# Patient Record
Sex: FEMALE | Race: WHITE | NOT HISPANIC OR LATINO | Employment: FULL TIME | URBAN - METROPOLITAN AREA
[De-identification: names, ages, dates, MRNs, and addresses within clinical notes are randomized per-mention and may not be internally consistent; named-entity substitution may affect disease eponyms.]

---

## 2023-08-29 ENCOUNTER — HOSPITAL ENCOUNTER (EMERGENCY)
Facility: HOSPITAL | Age: 43
Discharge: HOME | End: 2023-08-29
Admitting: EMERGENCY MEDICINE

## 2023-08-29 VITALS
RESPIRATION RATE: 16 BRPM | HEART RATE: 88 BPM | OXYGEN SATURATION: 100 % | TEMPERATURE: 97.1 F | SYSTOLIC BLOOD PRESSURE: 152 MMHG | HEIGHT: 67 IN | WEIGHT: 141 LBS | BODY MASS INDEX: 22.13 KG/M2 | DIASTOLIC BLOOD PRESSURE: 97 MMHG

## 2023-08-29 DIAGNOSIS — M77.00 MEDIAL EPICONDYLITIS: ICD-10-CM

## 2023-08-29 PROCEDURE — 99283 EMERGENCY DEPT VISIT LOW MDM: CPT

## 2023-08-29 RX ORDER — METHYLPREDNISOLONE 4 MG
TABLET, DOSE PACK ORAL
Qty: 21 TABLET | Refills: 0 | Status: SHIPPED | OUTPATIENT
Start: 2023-08-29

## 2023-08-29 ASSESSMENT — ENCOUNTER SYMPTOMS
JOINT SWELLING: 1
NUMBNESS: 1
SHORTNESS OF BREATH: 0
ARTHRALGIAS: 1

## 2023-08-29 ASSESSMENT — ACTIVITIES OF DAILY LIVING (ADL): ADLS_ACUITY_SCORE: 33

## 2023-08-29 NOTE — Clinical Note
Dee Dee Ambrose was seen and treated in our emergency department on 8/29/2023.  She may return to work on 09/04/2023.       If you have any questions or concerns, please don't hesitate to call.      Gisele Blake PA-C

## 2023-08-30 NOTE — ED PROVIDER NOTES
EMERGENCY DEPARTMENT ENCOUNTER      NAME: Dee Dee Ambrose  AGE: 42 year old female  YOB: 1980  MRN: [id number]  EVALUATION DATE & TIME: 8/29/2023 10:54 PM    PCP: No Ref-Primary, Physician    ED PROVIDER: Gisele Blake PA-C      Chief Complaint   Patient presents with    elbow pain         FINAL IMPRESSION:  1. Medial epicondylitis          ED COURSE & MEDICAL DECISION MAKING:    Pertinent Labs & Imaging studies reviewed. (See chart for details)    42 year old female presents to the Emergency Department for evaluation of elbow pain.    Physical exam is remarkable for a generally well-appearing female who is in no acute distress.  She has tenderness to palpation on the bilateral medial epicondyles, no overlying erythema, warmth, or swelling noted.  She has normal range of motion of the shoulders, wrists, and fingers.  She has good distal sensation in the bilateral hands with capillary refill less than 2 seconds, strong radial pulses bilaterally.  She endorses pain with full flexion of the bilateral elbows.  Vital signs are stable and she is afebrile.    I do not think any emergent labs or imaging are indicated at this time.  The patient denies any symptoms concerning for systemic infection and there is no clinical evidence of septic arthritis, cellulitis, or septic bursitis at this time.  She denies any history of bony trauma/falls so I do not think x-rays would be helpful today.  Symptoms are most consistent with a medial epicondylitis likely from overuse at work.  I prescribed her a Medrol Dosepak and we discussed use of Tylenol and ibuprofen for pain as well as ice.  Work note provided so she can rest the areas.  Advised her to follow-up with her primary care provider for a recheck and return here for any new or worsening symptoms.  The patient is agreeable with this treatment plan and verbalized understanding.    Medical Decision Making    History:  Supplemental history from: Documented in  chart, if applicable  External Record(s) reviewed: Documented in chart, if applicable.    Work Up:  Chart documentation includes differential considered and any EKGs or imaging independently interpreted by provider, where specified.  In additional to work up documented, I considered the following work up: Imaging XR, but deferred due to no trauma.    External consultation:  Discussion of management with another provider: Documented in chart, if applicable    Complicating factors:  Care impacted by chronic illness: N/A  Care affected by social determinants of health: N/A    Disposition considerations: Discharge. I prescribed additional prescription strength medication(s) as charted. N/A.    ED Course   11:10 PM Performed my initial history and physical exam. Discussed workup in the emergency department, management of symptoms, and likely disposition. I discussed the plan for discharge with the patient or family and they are agreeable.. We discussed supportive cares at home and reasons for return to the ER including new or worsening symptoms - all questions and concerns addressed. Patient to be discharged by RN.    At the conclusion of the encounter I discussed the results of all of the tests and the disposition. The questions were answered. The patient or family acknowledged understanding and was agreeable with the care plan.     Voice recognition software was used in the creation of this note. Any grammatical or nonsensical errors are due to inherent errors with the software and are not the intention of the writer.     MEDICATIONS GIVEN IN THE EMERGENCY:  Medications - No data to display    NEW PRESCRIPTIONS STARTED AT TODAY'S ER VISIT  Discharge Medication List as of 8/29/2023 11:33 PM        START taking these medications    Details   methylPREDNISolone (MEDROL DOSEPAK) 4 MG tablet therapy pack Follow Package Directions, Disp-21 tablet, R-0, E-Prescribe                   =================================================================    HPI    Patient information was obtained from: patient    Use of : N/A        Dee Dee Ambrose is a 42 year old female who presents to this ED with bilateral elbow pain.     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 She developed pain in her bilateral medial elbows that started after 8 months working there and has increasingly gotten worse. She has struggled to unlock doors and lift objects secondary to the pain. She has numbness in her elbow but no numbness or tingling in her arms. She has swelling in her arms and takes ibuprofen for the pain with temporary improvement. Denies redness, chest pain, shortness of breath, or any other complaints at this time.      REVIEW OF SYSTEMS   Review of Systems   Respiratory:  Negative for shortness of breath.    Cardiovascular:  Negative for chest pain.   Musculoskeletal:  Positive for arthralgias (Bilateral elbow) and joint swelling.        No redness   Neurological:  Positive for numbness.       All other systems reviewed and are negative unless noted in HPI.      PAST MEDICAL HISTORY:  No past medical history on file.    PAST SURGICAL HISTORY:  No past surgical history on file.    CURRENT MEDICATIONS:    methylPREDNISolone (MEDROL DOSEPAK) 4 MG tablet therapy pack  IBU-200 OR  oxycodone-acetaminophen (PERCOCET) 5-325 MG per tablet        ALLERGIES:  No Known Allergies    FAMILY HISTORY:  Family History   Problem Relation Age of Onset    Cancer Mother         lung    Unknown/Adopted Father        SOCIAL HISTORY:   Social History     Socioeconomic History    Marital status: Unknown   Tobacco Use    Smoking status: Every Day     Packs/day: 0.50     Years: 15.00     Pack years: 7.50     Types: Cigarettes   Substance and Sexual Activity    Alcohol use: Yes     Comment: rarely    Drug use: No       VITALS:  Patient Vitals for the past 24 hrs:   BP Temp Temp src Pulse Resp SpO2  "Height Weight   08/29/23 2224 (!) 152/97 97.1  F (36.2  C) Temporal 88 16 100 % 1.702 m (5' 7\") 64 kg (141 lb)       PHYSICAL EXAM    VITAL SIGNS: BP (!) 152/97   Pulse 88   Temp 97.1  F (36.2  C) (Temporal)   Resp 16   Ht 1.702 m (5' 7\")   Wt 64 kg (141 lb)   LMP 08/04/2023   SpO2 100%   BMI 22.08 kg/m    General Appearance: Alert, cooperative, normal speech and facial symmetry, appears stated age, the patient does not appear in distress  Head:  Normocephalic, without obvious abnormality, atraumatic  Extremities:  Tenderness to palpation on the bilateral medial epicondyles, no overlying erythema, warmth, or swelling noted.  She has normal range of motion of the shoulders, wrists, and fingers.  She has good distal sensation in the bilateral hands with capillary refill less than 2 seconds, strong radial pulses bilaterally.  She endorses pain with full flexion of the bilateral elbows.  Neuro: Patient is awake, alert, and responsive to voice. No gross motor weaknesses or sensory loss; moves all extremities.    LAB:  All pertinent labs reviewed and interpreted.  Labs Ordered and Resulted from Time of ED Arrival to Time of ED Departure - No data to display    RADIOLOGY:  Reviewed all pertinent imaging. Please see official radiology report.  No orders to display         Luís HAYNES, am serving as a scribe to document services personally performed by Gisele Blake PA-C based on my observation and the provider's statements to me. Gisele HAYNES PA-C attest that Luís Spencer is acting in a scribe capacity, has observed my performance of the services and has documented them in accordance with my direction.     Gisele Blake PA-C  Emergency Medicine  [facility] EMERGENCY DEPARTMENT  [address] [id number] [phone]  Dept: [phone]       Gisele Blake PA-C  08/30/23 0033    "

## 2023-08-30 NOTE — DISCHARGE INSTRUCTIONS
You were seen here today for evaluation of elbow pain.  Your exam today is consistent with medial epicondylitis (golfer's elbow).    I will prescribe you a steroid Dosepak to help reduce inflammation in your elbows. You may take Tylenol and ibuprofen for pain/fever, do not exceed 4000 mg of Tylenol per day or 3200 mg ofibuprofen per day.    Ice for 20 minutes/h.    Follow-up with your primary care clinic for recheck if your symptoms are not improving.  Return here for any new or worsening symptoms including severe pain, fever, worsening redness or swelling, loss of sensation or function in your hands, or any other symptoms that concern you.

## 2023-08-30 NOTE — ED TRIAGE NOTES
Patient reports that she has bilateral elbow pain that has been progressing over the past few months. She reports having difficulty lifting anything. 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      Triage Assessment       Row Name 08/29/23 7625       Triage Assessment (Adult)    Airway WDL WDL       Respiratory WDL    Respiratory WDL WDL       Cardiac WDL    Cardiac WDL WDL       Cognitive/Neuro/Behavioral WDL    Cognitive/Neuro/Behavioral WDL WDL

## 2023-09-03 ENCOUNTER — HOSPITAL ENCOUNTER (EMERGENCY)
Facility: HOSPITAL | Age: 43
Discharge: HOME | End: 2023-09-03
Attending: EMERGENCY MEDICINE | Admitting: EMERGENCY MEDICINE

## 2023-09-03 VITALS
OXYGEN SATURATION: 100 % | WEIGHT: 141 LBS | SYSTOLIC BLOOD PRESSURE: 144 MMHG | TEMPERATURE: 98.6 F | DIASTOLIC BLOOD PRESSURE: 83 MMHG | BODY MASS INDEX: 22.08 KG/M2 | HEART RATE: 80 BPM | RESPIRATION RATE: 16 BRPM

## 2023-09-03 DIAGNOSIS — M77.02 GOLFERS ELBOW OF LEFT UPPER EXTREMITY: ICD-10-CM

## 2023-09-03 PROCEDURE — 99282 EMERGENCY DEPT VISIT SF MDM: CPT

## 2023-09-03 RX ORDER — IBUPROFEN 400 MG/1
400 TABLET, FILM COATED ORAL EVERY 6 HOURS PRN
Qty: 20 TABLET | Refills: 0 | Status: SHIPPED | OUTPATIENT
Start: 2023-09-03

## 2023-09-03 RX ORDER — IBUPROFEN 600 MG/1
600 TABLET, FILM COATED ORAL ONCE
Status: DISCONTINUED | OUTPATIENT
Start: 2023-09-03 | End: 2023-09-03 | Stop reason: HOSPADM

## 2023-09-03 NOTE — ED PROVIDER NOTES
EMERGENCY DEPARTMENT ENCOUNTER      NAME: Dee Dee Ambrose  AGE: 42 year old female  YOB: 1980  MRN: [id number]  EVALUATION DATE & TIME: 9/3/2023  3:42 PM    PCP: No Ref-Primary, Physician    ED PROVIDER: Bharati Gabriel M.D.      Chief Complaint   Patient presents with    Elbow Injury         FINAL IMPRESSION:  1. Nani elbow of left upper extremity          ED COURSE & MEDICAL DECISION MAKING:    ED Course as of 09/03/23 1909   Sun Sep 03, 2023   1613 Pt here because today is last day of steroids for paresthesia and pain to left elbow and paresthesia left little finger and ring finger primarily palmar aspect iwth known Haritha's Elbow, because her work note expires today, last dose steroid today, exacerbated symtpoms last night while carrying heavy objects, wants further time off work and ok with plan to follow up with orthopedics and NSAID therapy. Patient discharged after being provided with extensive anticipatory guidance and given return precautions, importance of PMD follow-up emphasized.        Pertinent Labs & Imaging studies reviewed. (See chart for details)    N95 worn  A face shield was worn also  COVID PPE    Medical Decision Making    History:  Supplemental history from: N/A  External Record(s) reviewed: Documented in chart, if applicable.    Work Up:  Chart documentation includes differential considered and any EKGs or imaging independently interpreted by provider, where specified.  In additional to work up documented, I considered the following work up: Documented in chart, if applicable.    External consultation:  Discussion of management with another provider: Documented in chart, if applicable    Complicating factors:  Care impacted by chronic illness: N/A  Care affected by social determinants of health: N/A    Disposition considerations: Discharge. I prescribed additional prescription strength medication(s) as charted. See documentation for any additional details.        At the  conclusion of the encounter I discussed the results of all of the tests and the disposition. The questions were answered. The patient or family acknowledged understanding and was agreeable with the care plan.     MEDICATIONS GIVEN IN THE EMERGENCY:  Medications - No data to display      NEW PRESCRIPTIONS STARTED AT TODAY'S ER VISIT  Discharge Medication List as of 9/3/2023  4:23 PM        START taking these medications    Details   !! ibuprofen (ADVIL/MOTRIN) 400 MG tablet Take 1 tablet (400 mg) by mouth every 6 hours as needed for moderate pain, Disp-20 tablet, R-0, E-Prescribe       !! - Potential duplicate medications found. Please discuss with provider.             =================================================================    HPI    As per chart review: Patient seen at [facility] ED on 8/29/23 (5 days ago) for evaluation of elbow pain diagnosed as medial epicondylitis from overuse at work. Exam reassuring. No labs or imaging were performed at this time. Patient was discharged with a methylprednisolone, tylenol, and ibuprofen. Patient recommended follow-up with PCP.     Dee Dee Ambrose is a 42 year old female with no contributory PMHx who presents to the ED today via walking with elbow pain.     Patient reports recurring bilateral pain in her elbows. She reports numbness in her left pinky and middle fingers. She rates her current pain 7/10, although acknowledges that her pain was worse earlier. 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 Patient states that she is left-handed.       REVIEW OF SYSTEMS   All other systems reviewed and are negative except as noted above in HPI.    PAST MEDICAL HISTORY:  No past medical history on file.    PAST SURGICAL HISTORY:  No past surgical history on file.    CURRENT MEDICATIONS:    ibuprofen (ADVIL/MOTRIN) 400 MG tablet  IBU-200 OR  methylPREDNISolone (MEDROL DOSEPAK) 4 MG tablet therapy  pack  oxycodone-acetaminophen (PERCOCET) 5-325 MG per tablet        ALLERGIES:  No Known Allergies    FAMILY HISTORY:  Family History   Problem Relation Age of Onset    Cancer Mother         lung    Unknown/Adopted Father        SOCIAL HISTORY:   Social History     Socioeconomic History    Marital status: Single   Tobacco Use    Smoking status: Every Day     Packs/day: 0.50     Years: 15.00     Pack years: 7.50     Types: Cigarettes   Substance and Sexual Activity    Alcohol use: Yes     Comment: rarely    Drug use: No       VITALS:  Patient Vitals for the past 24 hrs:   BP Temp Temp src Pulse Resp SpO2 Weight   09/03/23 1533 (!) 144/83 98.6  F (37  C) Oral 80 16 100 % 64 kg (141 lb)       PHYSICAL EXAM    GENERAL: Awake, alert.  In no acute distress.   HEENT: Normocephalic, atraumatic.  Pupils equal, round and reactive.  Conjunctiva normal.  EOMI.  NECK: No stridor or apparent deformity.  EXTREMITIES: No lower extremity swelling or edema. Left forearm and elbow supination and pronation intact. Pulse 2+. Left hand  5/5. Lumbrical flexion 5/5. Finger abduction 5/5.   NEURO: Alert and oriented to person, place and time.  Cranial nerves grossly intact.  No focal motor deficit.  PSYCH: Normal mood and affect  SKIN: No rashes      I, Karol Calloway, am serving as a scribe to document services personally performed by Dr. Bharati Gabriel based on my observation and the provider's statements to me. IBharati MD attest that Karol Calloway is acting in a scribe capacity, has observed my performance of the services and has documented them in accordance with my direction.       Bharati Gabriel MD  09/03/23 2878

## 2023-09-03 NOTE — ED TRIAGE NOTES
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 Has bilateral elbow pain for last few months.  Came here to ER and given steroids.  Pt states today she was moving and pain increased in left elbow.  States fingers 2, 3, 4 are numb.  Pain radiates to neck     Triage Assessment       Row Name 09/03/23 2146       Triage Assessment (Adult)    Airway WDL WDL       Respiratory WDL    Respiratory WDL WDL       Skin Circulation/Temperature WDL    Skin Circulation/Temperature WDL WDL       Cardiac WDL    Cardiac WDL WDL       Peripheral/Neurovascular WDL    Peripheral Neurovascular WDL WDL       Cognitive/Neuro/Behavioral WDL    Cognitive/Neuro/Behavioral WDL WDL

## 2023-09-03 NOTE — Clinical Note
Dee Dee Ambrose was seen and treated in our emergency department on 9/3/2023.  She may return to work on 09/06/2023.       If you have any questions or concerns, please don't hesitate to call.      Bharati Gabriel MD

## 2023-09-05 ENCOUNTER — TRANSFERRED RECORDS (OUTPATIENT)
Dept: HEALTH INFORMATION MANAGEMENT | Facility: CLINIC | Age: 43
End: 2023-09-05

## 2023-09-15 ENCOUNTER — HOSPITAL ENCOUNTER (EMERGENCY)
Facility: HOSPITAL | Age: 43
Discharge: HOME | End: 2023-09-16
Attending: STUDENT IN AN ORGANIZED HEALTH CARE EDUCATION/TRAINING PROGRAM | Admitting: STUDENT IN AN ORGANIZED HEALTH CARE EDUCATION/TRAINING PROGRAM

## 2023-09-15 ENCOUNTER — ANCILLARY PROCEDURE (OUTPATIENT)
Dept: ULTRASOUND IMAGING | Facility: HOSPITAL | Age: 43
End: 2023-09-15
Attending: STUDENT IN AN ORGANIZED HEALTH CARE EDUCATION/TRAINING PROGRAM

## 2023-09-15 DIAGNOSIS — R14.0 ABDOMINAL BLOATING: ICD-10-CM

## 2023-09-15 LAB
ABO/RH(D): NORMAL
ANTIBODY SCREEN: NEGATIVE
BASOPHILS # BLD AUTO: 0 10E3/UL (ref 0–0.2)
BASOPHILS NFR BLD AUTO: 0 %
EOSINOPHIL # BLD AUTO: 0.1 10E3/UL (ref 0–0.7)
EOSINOPHIL NFR BLD AUTO: 1 %
ERYTHROCYTE [DISTWIDTH] IN BLOOD BY AUTOMATED COUNT: 13.5 % (ref 10–15)
HCG INTACT+B SERPL-ACNC: <1 MIU/ML
HCT VFR BLD AUTO: 38.2 % (ref 35–47)
HGB BLD-MCNC: 12.4 G/DL (ref 11.7–15.7)
HOLD SPECIMEN: NORMAL
HOLD SPECIMEN: NORMAL
IMM GRANULOCYTES # BLD: 0 10E3/UL
IMM GRANULOCYTES NFR BLD: 0 %
LYMPHOCYTES # BLD AUTO: 3 10E3/UL (ref 0.8–5.3)
LYMPHOCYTES NFR BLD AUTO: 33 %
MCH RBC QN AUTO: 30 PG (ref 26.5–33)
MCHC RBC AUTO-ENTMCNC: 32.5 G/DL (ref 31.5–36.5)
MCV RBC AUTO: 92 FL (ref 78–100)
MONOCYTES # BLD AUTO: 0.5 10E3/UL (ref 0–1.3)
MONOCYTES NFR BLD AUTO: 5 %
NEUTROPHILS # BLD AUTO: 5.4 10E3/UL (ref 1.6–8.3)
NEUTROPHILS NFR BLD AUTO: 61 %
NRBC # BLD AUTO: 0 10E3/UL
NRBC BLD AUTO-RTO: 0 /100
PLATELET # BLD AUTO: 269 10E3/UL (ref 150–450)
RBC # BLD AUTO: 4.14 10E6/UL (ref 3.8–5.2)
SPECIMEN EXPIRATION DATE: NORMAL
WBC # BLD AUTO: 9 10E3/UL (ref 4–11)

## 2023-09-15 PROCEDURE — 36415 COLL VENOUS BLD VENIPUNCTURE: CPT | Performed by: STUDENT IN AN ORGANIZED HEALTH CARE EDUCATION/TRAINING PROGRAM

## 2023-09-15 PROCEDURE — 85025 COMPLETE CBC W/AUTO DIFF WBC: CPT | Performed by: STUDENT IN AN ORGANIZED HEALTH CARE EDUCATION/TRAINING PROGRAM

## 2023-09-15 PROCEDURE — 84484 ASSAY OF TROPONIN QUANT: CPT | Performed by: STUDENT IN AN ORGANIZED HEALTH CARE EDUCATION/TRAINING PROGRAM

## 2023-09-15 PROCEDURE — 250N000013 HC RX MED GY IP 250 OP 250 PS 637: Performed by: STUDENT IN AN ORGANIZED HEALTH CARE EDUCATION/TRAINING PROGRAM

## 2023-09-15 PROCEDURE — 82374 ASSAY BLOOD CARBON DIOXIDE: CPT | Performed by: STUDENT IN AN ORGANIZED HEALTH CARE EDUCATION/TRAINING PROGRAM

## 2023-09-15 PROCEDURE — 99285 EMERGENCY DEPT VISIT HI MDM: CPT | Mod: 25

## 2023-09-15 PROCEDURE — 84702 CHORIONIC GONADOTROPIN TEST: CPT | Performed by: STUDENT IN AN ORGANIZED HEALTH CARE EDUCATION/TRAINING PROGRAM

## 2023-09-15 PROCEDURE — 86850 RBC ANTIBODY SCREEN: CPT | Performed by: STUDENT IN AN ORGANIZED HEALTH CARE EDUCATION/TRAINING PROGRAM

## 2023-09-15 RX ORDER — ACETAMINOPHEN 325 MG/1
975 TABLET ORAL ONCE
Status: COMPLETED | OUTPATIENT
Start: 2023-09-16 | End: 2023-09-15

## 2023-09-15 RX ADMIN — ACETAMINOPHEN 975 MG: 325 TABLET ORAL at 23:38

## 2023-09-15 ASSESSMENT — ACTIVITIES OF DAILY LIVING (ADL): ADLS_ACUITY_SCORE: 35

## 2023-09-15 NOTE — Clinical Note
Dee Dee Ambrose was seen and treated in our emergency department on 9/15/2023.  She may return to work on 09/17/2023.       If you have any questions or concerns, please don't hesitate to call.      Godfrey Hearn MD

## 2023-09-16 ENCOUNTER — APPOINTMENT (OUTPATIENT)
Dept: RADIOLOGY | Facility: HOSPITAL | Age: 43
End: 2023-09-16
Attending: STUDENT IN AN ORGANIZED HEALTH CARE EDUCATION/TRAINING PROGRAM

## 2023-09-16 VITALS
RESPIRATION RATE: 14 BRPM | WEIGHT: 149 LBS | TEMPERATURE: 99 F | SYSTOLIC BLOOD PRESSURE: 139 MMHG | OXYGEN SATURATION: 98 % | BODY MASS INDEX: 23.39 KG/M2 | DIASTOLIC BLOOD PRESSURE: 82 MMHG | HEART RATE: 75 BPM | HEIGHT: 67 IN

## 2023-09-16 LAB
ALBUMIN UR-MCNC: NEGATIVE MG/DL
ANION GAP SERPL CALCULATED.3IONS-SCNC: 14 MMOL/L (ref 7–15)
APPEARANCE UR: ABNORMAL
ATRIAL RATE - MUSE: 78 BPM
BACTERIA #/AREA URNS HPF: ABNORMAL /HPF
BILIRUB UR QL STRIP: NEGATIVE
BUN SERPL-MCNC: 11.7 MG/DL (ref 6–20)
CALCIUM SERPL-MCNC: 9.1 MG/DL (ref 8.6–10)
CHLORIDE SERPL-SCNC: 108 MMOL/L (ref 98–107)
COLOR UR AUTO: COLORLESS
CREAT SERPL-MCNC: 0.66 MG/DL (ref 0.51–0.95)
DEPRECATED HCO3 PLAS-SCNC: 19 MMOL/L (ref 22–29)
DIASTOLIC BLOOD PRESSURE - MUSE: NORMAL MMHG
EGFRCR SERPLBLD CKD-EPI 2021: >90 ML/MIN/1.73M2
GLUCOSE SERPL-MCNC: 91 MG/DL (ref 70–99)
GLUCOSE UR STRIP-MCNC: NEGATIVE MG/DL
HGB UR QL STRIP: NEGATIVE
INTERPRETATION ECG - MUSE: NORMAL
KETONES UR STRIP-MCNC: NEGATIVE MG/DL
LEUKOCYTE ESTERASE UR QL STRIP: ABNORMAL
MUCOUS THREADS #/AREA URNS LPF: PRESENT /LPF
NITRATE UR QL: NEGATIVE
P AXIS - MUSE: 78 DEGREES
PH UR STRIP: 5 [PH] (ref 5–7)
POTASSIUM SERPL-SCNC: 3.9 MMOL/L (ref 3.4–5.3)
PR INTERVAL - MUSE: 150 MS
QRS DURATION - MUSE: 80 MS
QT - MUSE: 386 MS
QTC - MUSE: 440 MS
R AXIS - MUSE: 84 DEGREES
RBC URINE: 1 /HPF
SODIUM SERPL-SCNC: 141 MMOL/L (ref 136–145)
SP GR UR STRIP: 1.01 (ref 1–1.03)
SQUAMOUS EPITHELIAL: 10 /HPF
SYSTOLIC BLOOD PRESSURE - MUSE: NORMAL MMHG
T AXIS - MUSE: 74 DEGREES
TRANSITIONAL EPI: <1 /HPF
TROPONIN T SERPL HS-MCNC: <6 NG/L
UROBILINOGEN UR STRIP-MCNC: <2 MG/DL
VENTRICULAR RATE- MUSE: 78 BPM
WBC URINE: 9 /HPF

## 2023-09-16 PROCEDURE — 93005 ELECTROCARDIOGRAM TRACING: CPT | Performed by: STUDENT IN AN ORGANIZED HEALTH CARE EDUCATION/TRAINING PROGRAM

## 2023-09-16 PROCEDURE — 71046 X-RAY EXAM CHEST 2 VIEWS: CPT

## 2023-09-16 PROCEDURE — 81001 URINALYSIS AUTO W/SCOPE: CPT | Performed by: STUDENT IN AN ORGANIZED HEALTH CARE EDUCATION/TRAINING PROGRAM

## 2023-09-16 ASSESSMENT — ACTIVITIES OF DAILY LIVING (ADL): ADLS_ACUITY_SCORE: 35

## 2023-09-16 NOTE — ED PROVIDER NOTES
EMERGENCY DEPARTMENT ENCOUNTER      NAME: Dee Dee Ambrose  AGE: 43 year old female  YOB: 1980  MRN: [id number]  EVALUATION DATE & TIME: 9/15/2023  9:39 PM    PCP: No Ref-Primary, Physician    ED PROVIDER: Godfrey Hearn MD      Chief Complaint   Patient presents with    Pregnant with tubes tied    Nausea    Abdominal Pain         FINAL IMPRESSION:  No diagnosis found.      ED COURSE & MEDICAL DECISION MAKING:    Pertinent Labs & Imaging studies reviewed. (See chart for details)  43 year old female presents to the Emergency Department for evaluation of abdominal bloating and positive home pregnancy test in the context of prior tubal ligation.    Patient states that over the past month she has been having feelings of generalized abdominal bloating and some malaise as well as weight gain of about 10 pounds over the past month.  She normally is rather short menstrual cycles and her last normal menstrual period was couple weeks ago.  States that over the past several weeks has taken several home pregnancy test 1 of which was negative and several of which were positive.  Denies any vaginal bleeding at this point in time or abnormal discharge.  Denies any measured fevers but has had chills on occasion at home.  Also notes some intermittent chest pain that is been present for the past several weeks.  Denies chest pain at the moment.    Exam patient appears somewhat anxious but is in no acute distress.  She is hemodynamically stable and afebrile.  Her abdomen is soft and benign without any guarding or rigidity.  No CVA tenderness.  Her lungs are clear without any wheezes or rales.  Heart is regular in rate and rhythm with good peripheral pulses.    Given positive pregnant test at home with previous tubal ligation this is concerning for possible ectopic pregnancy and will obtain a quantitative blood hCG.  Will obtain EKG and troponin as well as a chest x-ray to evaluate for signs of ACS or acute cardiopulmonary  pathology.    Labs reviewed and interpreted myself.  CBC does not show any significant blood dyscrasias.  Chemistry is also reassuring.  Urinalysis shows a few bacteria but also 10 squamous cells and I suspect this may be a contaminated specimen.  Will not empirically treat with antibiotics at this point in time the patient denies as patient denies any hematuria, dysuria or increased urinary frequency her beta-hCG is less than 1 and suspect her positive home pregnancy test for false positives.  Do not suspect ectopic pregnancy at this point.  Her EKG is nonischemic and troponin is negative.  Low suspicion for ACS.  Chest x-ray reviewed and interpreted myself and no signs of acute cardiopulmonary disease or pneumothorax.    Patient received Tylenol for headache and intermittent chest pain while in the emergency department with improvement in her symptoms.  Discussed lab findings with the patient she is reassured.  I did recommend follow-up with primary care doctor given her feelings of abdominal bloating and weight gain over the past month or so.  She is not currently established with a primary care doctor but she was given information to establish care with a clinic here in [facility].  Otherwise she is safe for discharge home.  She should return to the emergency department she develops heavy vaginal bleeding, persistent fevers, significant worsening abdominal pain, chest pain or other new or concerning symptoms.       10:10 pm I met and evaluated the patient.     Medical Decision Making    History:  Supplemental history from: Documented in chart, if applicable  External Record(s) reviewed: Documented in chart, if applicable.    Work Up:  Chart documentation includes differential considered and any EKGs or imaging independently interpreted by provider, where specified.  In additional to work up documented, I considered the following work up: Documented in chart, if applicable.    External consultation:  Discussion of  "management with another provider: Documented in chart, if applicable    Complicating factors:  Care impacted by chronic illness: N/A  Care affected by social determinants of health: N/A    Disposition considerations: Discharge. No recommendations on prescription strength medication(s). See documentation for any additional details.       At the conclusion of the encounter I discussed the results of all of the tests and the disposition. The questions were answered. The patient or family acknowledged understanding and was agreeable with the care plan.       MEDICATIONS GIVEN IN THE EMERGENCY:  Medications - No data to display    NEW PRESCRIPTIONS STARTED AT TODAY'S ER VISIT  New Prescriptions    No medications on file          =================================================================    HPI    Patient information was obtained from: Patient    Use of : N/A       Dee Dee Ambrose is a 43 year old female with no pertinent history who presents to this ED by walk-in for evaluation of positive pregnancy test with tube tied.     The patient reports she has not been feeling good over the past month and has gain about 13 pounds since August. The patient states when she eat a salad, she feels like she \"gained a few pounds \". Patient would feel nauseous when she wakes up and has been endorsing abdominal cramps. She also has been endorsing pain in her breast. The patient also reports she took multiple pregnancy tests and two of them came out positive. This surprised her because she had her tube tied about 20 years ago. Her last normal menstrual cycle was in the first week of September. No abnormal discharge. The patient has been constipated. She also endorse chest pain that started a couple of days ago. Patient states this could be due to her bad anxiety. Patient does not have any history of blood clot. The patient also reports there was a few bumps found in her breast and she was suppose to have ultrasound " "every 6 month. Patient took steroid for one of her health problems bur does not take any other medication. The patient denies vomiting, hematuria, and any fevers right now.    REVIEW OF SYSTEMS   Refer to the [facility]    PAST MEDICAL HISTORY:  No past medical history on file.    PAST SURGICAL HISTORY:  No past surgical history on file.        CURRENT MEDICATIONS:    IBU-200 OR  ibuprofen (ADVIL/MOTRIN) 400 MG tablet  methylPREDNISolone (MEDROL DOSEPAK) 4 MG tablet therapy pack  oxycodone-acetaminophen (PERCOCET) 5-325 MG per tablet        ALLERGIES:  Allergies   Allergen Reactions    Tramadol Unknown     Tachycardia and fever       FAMILY HISTORY:  Family History   Problem Relation Age of Onset    Cancer Mother         lung    Unknown/Adopted Father        SOCIAL HISTORY:   Social History     Socioeconomic History    Marital status: Single   Tobacco Use    Smoking status: Every Day     Packs/day: 0.50     Years: 15.00     Pack years: 7.50     Types: Cigarettes   Substance and Sexual Activity    Alcohol use: Yes     Comment: rarely    Drug use: No       VITALS:  BP (!) 163/88   Pulse 93   Temp 99  F (37.2  C) (Oral)   Resp 16   Ht 1.702 m (5' 7\")   Wt 67.6 kg (149 lb)   LMP 09/03/2023   SpO2 98%   BMI 23.34 kg/m      PHYSICAL EXAM    Constitutional: Well developed, Well nourished, NAD,  HENT: Normocephalic, Atraumatic,  mucous membranes moist,   Neck- trachea midline, No stridor.    Eyes:EOMI, Conjunctiva normal, No discharge.   Respiratory: Normal breath sounds, No respiratory distress, No wheezing, Speaks full sentences easily.    Cardiovascular: Normal heart rate, Regular rhythm,  No murmurs, No rubs, No gallops. Chest wall nontender.    Abdominal: Soft, No tenderness, No rebound or guarding.     Musculoskeletal:. No edema. No cyanosis,   Integument: Warm, Dry, No erythema, No rash.   Neurologic: Alert & oriented x 3   Psychiatric: Affect normal, Judgment normal, Mood normal. Cooperative.      LAB:  All " pertinent labs reviewed and interpreted.  Results for orders placed or performed during the hospital encounter of 09/15/23   HCG quantitative pregnancy (blood)   Result Value Ref Range    hCG Quantitative <1 <5 mIU/mL   CBC with platelets and differential   Result Value Ref Range    WBC Count 9.0 4.0 - 11.0 10e3/uL    RBC Count 4.14 3.80 - 5.20 10e6/uL    Hemoglobin 12.4 11.7 - 15.7 g/dL    Hematocrit 38.2 35.0 - 47.0 %    MCV 92 78 - 100 fL    MCH 30.0 26.5 - 33.0 pg    MCHC 32.5 31.5 - 36.5 g/dL    RDW 13.5 10.0 - 15.0 %    Platelet Count 269 150 - 450 10e3/uL    % Neutrophils 61 %    % Lymphocytes 33 %    % Monocytes 5 %    % Eosinophils 1 %    % Basophils 0 %    % Immature Granulocytes 0 %    NRBCs per 100 WBC 0 <1 /100    Absolute Neutrophils 5.4 1.6 - 8.3 10e3/uL    Absolute Lymphocytes 3.0 0.8 - 5.3 10e3/uL    Absolute Monocytes 0.5 0.0 - 1.3 10e3/uL    Absolute Eosinophils 0.1 0.0 - 0.7 10e3/uL    Absolute Basophils 0.0 0.0 - 0.2 10e3/uL    Absolute Immature Granulocytes 0.0 <=0.4 10e3/uL    Absolute NRBCs 0.0 10e3/uL   Adult Type and Screen   Result Value Ref Range    ABO/RH(D) A POS     Antibody Screen Negative Negative    SPECIMEN EXPIRATION DATE [id number]        RADIOLOGY:  Reviewed all pertinent imaging. Please see official radiology report.  POC US OB TRANSABDOMINAL LIMITED    (Results Pending)       EKG:    Performed at: 12:36 AM    Impression: EKG shows sinus rhythm with occasional PVCs ventricular rate of 78 beats a minute, normal axis, normal PA, QRS and QTc durations, no ST elevation or ischemic T wave changes.  No priors available for comparison.      I have independently reviewed and interpreted the EKG(s) documented above.    PROCEDURES:         [employer] System Documentation:   CMS Diagnoses:              I, Let Joslyn, am serving as a scribe to document services personally performed by Godfrey Hearn MD based on my observation and the provider's statements to me. I, Godfrey Hearn MD,  attest that Joslyn Jorgensen is acting in a scribe capacity, has observed my performance of the services and has documented them in accordance with my direction.    Godfrey Hearn MD  [facility] EMERGENCY DEPARTMENT  [address] [place] [id number]  [phone]      Godfrey Hearn MD  09/16/23 0567

## 2023-09-16 NOTE — ED TRIAGE NOTES
"Pt states she has had 2 of 3 pregnancy tests come back positive over past month.  Pt has her tubes tied.  Pt c/o \"cramping\" abdominal pain, nausea, sensitive and painful breasts, and some weight gain since early August.  Pt denies vomiting and diarrhea.     Triage Assessment       Row Name 09/15/23 2059       Triage Assessment (Adult)    Airway WDL WDL       Respiratory WDL    Respiratory WDL WDL       Skin Circulation/Temperature WDL    Skin Circulation/Temperature WDL WDL       Cardiac WDL    Cardiac WDL WDL       Peripheral/Neurovascular WDL    Peripheral Neurovascular WDL WDL       Cognitive/Neuro/Behavioral WDL    Cognitive/Neuro/Behavioral WDL WDL                    "